# Patient Record
Sex: FEMALE | Race: WHITE | URBAN - METROPOLITAN AREA
[De-identification: names, ages, dates, MRNs, and addresses within clinical notes are randomized per-mention and may not be internally consistent; named-entity substitution may affect disease eponyms.]

---

## 2017-11-02 ENCOUNTER — OFFICE VISIT (OUTPATIENT)
Dept: FAMILY MEDICINE CLINIC | Age: 22
End: 2017-11-02

## 2017-11-02 VITALS
DIASTOLIC BLOOD PRESSURE: 64 MMHG | WEIGHT: 132.8 LBS | BODY MASS INDEX: 20.84 KG/M2 | TEMPERATURE: 99 F | OXYGEN SATURATION: 98 % | HEIGHT: 67 IN | RESPIRATION RATE: 18 BRPM | SYSTOLIC BLOOD PRESSURE: 98 MMHG | HEART RATE: 110 BPM

## 2017-11-02 DIAGNOSIS — J45.20 MILD INTERMITTENT ASTHMA WITHOUT COMPLICATION: Primary | ICD-10-CM

## 2017-11-02 DIAGNOSIS — F41.8 DEPRESSION WITH ANXIETY: ICD-10-CM

## 2017-11-02 RX ORDER — LEVONORGESTREL / ETHINYL ESTRADIOL AND ETHINYL ESTRADIOL 150-30(84)
30 KIT ORAL DAILY
COMMUNITY

## 2017-11-02 RX ORDER — ALBUTEROL SULFATE 90 UG/1
1 AEROSOL, METERED RESPIRATORY (INHALATION)
Qty: 1 INHALER | Refills: 2 | Status: SHIPPED | OUTPATIENT
Start: 2017-11-02

## 2017-11-02 RX ORDER — SERTRALINE HYDROCHLORIDE 25 MG/1
TABLET, FILM COATED ORAL
Qty: 60 TAB | Refills: 1 | Status: SHIPPED | OUTPATIENT
Start: 2017-11-02 | End: 2018-01-23 | Stop reason: SDUPTHER

## 2017-11-02 RX ORDER — FEXOFENADINE HCL AND PSEUDOEPHEDRINE HCI 180; 240 MG/1; MG/1
1 TABLET, EXTENDED RELEASE ORAL DAILY
COMMUNITY

## 2017-11-02 NOTE — PROGRESS NOTES
Patient Name: Penny Spencer   MRN: <[id number]>    Melisa Faustin is a 22 y.o. female who presents with the following: Here to establish care with new PCP. Hx of intermittent asthma. Triggers are usually exercise and humidity. Previously did well w/o albuterol inhaler but would like refill. Reports symptoms of anxiety/depression. Feels like it is worse over the past mnth, possible related to starting a new job. Denies active SI but will have fleeting thoughts of self harm without a plan. Support includes her boyfriend. Has previously seen a therapist in the past which was not helpful. KATIA and PHQ scores are high today. Would like to start meds. Has upcoming appt with GYN. Believes her current OCPs are causing low libido. Reports dx of PCOS. Review of Systems   Constitutional: Negative for chills, fever, malaise/fatigue and weight loss. HENT: Negative for hearing loss, nosebleeds and sore throat. Respiratory: Negative for cough, sputum production, shortness of breath and wheezing. Cardiovascular: Negative for chest pain, palpitations, leg swelling and PND. Gastrointestinal: Negative for abdominal pain, blood in stool, constipation, diarrhea, nausea and vomiting. Genitourinary: Negative for dysuria, frequency and urgency. Musculoskeletal: Negative for back pain, falls, joint pain, myalgias and neck pain. Skin: Negative for itching and rash. Neurological: Negative for dizziness, sensory change, focal weakness and loss of consciousness. Psychiatric/Behavioral: Positive for depression. Negative for hallucinations and substance abuse. The patient is nervous/anxious. All other systems reviewed and are negative. The patient's medications, allergies, past medical history, surgical history, family history and social history were reviewed and updated where appropriate. Prior to Admission medications    Medication Sig Start Date End Date Taking?  Authorizing Provider fexofenadine-pseudoephedrine (ALLEGRA-D 24 HOUR) 180-240 mg per tablet Take 1 Tab by mouth daily. Yes Historical Provider   L-Norgest&E Estradiol-E Estrad (ASHLYNA) 0.15 mg-30 mcg (84)/10 mcg (7) 3MPk Take 30 mcg by mouth daily. Yes Historical Provider   tazorotene (TAZORAC) 0.05 % topical cream Apply  to affected area nightly. use thin film   Yes Historical Provider       No Known Allergies      Past Medical History:   Diagnosis Date    Asthma 2009    Depression with anxiety 11/2/2017    PCOS (polycystic ovarian syndrome)        History reviewed. No pertinent surgical history. Family History   Problem Relation Age of Onset    Hypertension Mother     Hypertension Maternal Grandmother        Social History     Social History    Marital status: SINGLE     Spouse name: N/A    Number of children: N/A    Years of education: N/A     Occupational History    Not on file. Social History Main Topics    Smoking status: Never Smoker    Smokeless tobacco: Never Used    Alcohol use No    Drug use: No    Sexual activity: Yes     Partners: Male     Other Topics Concern    Not on file     Social History Narrative    No narrative on file           OBJECTIVE    Visit Vitals    BP 98/64 (BP 1 Location: Left arm, BP Patient Position: Sitting)    Pulse (!) 110    Temp 99 °F (37.2 °C) (Oral)    Resp 18    Ht 5' 7\" (1.702 m)    Wt 132 lb 12.8 oz (60.2 kg)    LMP 10/20/2017 (Approximate)    SpO2 98%    BMI 20.8 kg/m2       Physical Exam   Constitutional: She is well-developed, well-nourished, and in no distress. No distress. HENT:   Head: Normocephalic and atraumatic. Right Ear: External ear normal.   Left Ear: External ear normal.   Eyes: Conjunctivae and EOM are normal. Pupils are equal, round, and reactive to light. Cardiovascular: Normal rate, regular rhythm and normal heart sounds. Exam reveals no gallop and no friction rub. No murmur heard.   Pulmonary/Chest: Effort normal and breath sounds normal. No respiratory distress. She has no wheezes. Skin: She is not diaphoretic. Psychiatric: Mood, memory, affect and judgment normal.   Nursing note and vitals reviewed. ASSESSMENT AND PLAN  Randy Goncalves is a 22 y.o. female who presents today for:    1. Mild intermittent asthma without complication  - albuterol (PROVENTIL HFA, VENTOLIN HFA, PROAIR HFA) 90 mcg/actuation inhaler; Take 1 Puff by inhalation every six (6) hours as needed for Wheezing. Dispense: 1 Inhaler; Refill: 2    2. Depression with anxiety  Will start on SSRI therapy and titrate up; reviewed side effects and how to take medications. Pt denies active SI. May consider therapy in the future. - sertraline (ZOLOFT) 25 mg tablet; Take 1/2 tab by mouth daily for the first two weeks, then increased to 1 tab daily. Dispense: 60 Tab; Refill: 1       There are no discontinued medications. Follow-up Disposition:  Return in about 6 weeks (around 12/14/2017) for Medication Check. Medication risks/benefits/costs/interactions/alternatives discussed with patient. Advised patient to call back or return to office if symptoms worsen/change/persist. If patient cannot reach us or should anything more severe/urgent arise he/she should proceed directly to the nearest emergency department. Discussed expected course/resolution/complications of diagnosis in detail with patient. Patient given a written after visit summary which includes his/her diagnoses, current medications and vitals. Patient expressed understanding with the diagnosis and plan.      Thelma Acosta M.D.

## 2017-11-02 NOTE — MR AVS SNAPSHOT
Visit Information Date & Time Provider Department Dept. Phone Encounter #  
 11/2/2017  8:30 AM Palmira Dior MD [facility] [address] [place] [phone] [id number] Follow-up Instructions Return in about 6 weeks (around 12/14/2017) for Medication Check. Upcoming Health Maintenance Date Due Hepatitis A Peds Age 1-18 (1 of 2 - Standard Series) 11/13/1997 DTaP/Tdap/Td series (1 - Tdap) 11/13/2003 HPV AGE 9Y-26Y (1 of 3 - Female 3 Dose Series) 11/13/2007 INFLUENZA AGE 9 TO ADULT 8/1/2017 Allergies as of 11/2/2017  Review Complete On: 11/2/2017 By: Palmira Dior MD  
 No Known Allergies Current Immunizations  Never Reviewed No immunizations on file. Not reviewed this visit You Were Diagnosed With   
  
 Codes Comments Mild intermittent asthma without complication    -  Primary ICD-10-CM: J45.20 ICD-9-CM: 493.90 Vitals BP Pulse Temp Resp Height(growth percentile) Weight(growth percentile) 98/64 (BP 1 Location: Left arm, BP Patient Position: Sitting) (!) 110 99 °F (37.2 °C) (Oral) 18 5' 7\" (1.702 m) 132 lb 12.8 oz (60.2 kg) LMP SpO2 BMI OB Status Smoking Status 10/20/2017 (Approximate) 98% 20.8 kg/m2 Having regular periods Never Smoker BMI and BSA Data Body Mass Index Body Surface Area  
 20.8 kg/m 2 1.69 m 2 Preferred Pharmacy Pharmacy Name Phone [facility] [phone] Your Updated Medication List  
  
   
This list is accurate as of: 11/2/17  8:48 AM.  Always use your most recent med list. ALLEGRA-D 24 HOUR 180-240 mg per tablet Generic drug:  fexofenadine-pseudoephedrine Take 1 Tab by mouth daily. ASHLYNA 0.15 mg-30 mcg (84)/10 mcg (7) 3mpk Generic drug:  L-Norgest&E Grubbs Og Take 30 mcg by mouth daily. TAZORAC 0.05 % topical cream  
Generic drug:  tazorotene Apply  to affected area nightly. use thin film Follow-up Instructions Return in about 6 weeks (around 12/14/2017) for Medication Check. Introducing [facility]! [employer] introduces [employer]Echelle patient portal. Now you can access parts of your medical record, email your doctor's office, and request medication refills online. 1. In your internet browser, go to [email or web address] 2. Click on the First Time User? Click Here link in the Sign In box. You will see the New Member Sign Up page. 3. Enter your monEchelle Access Code exactly as it appears below. You will not need to use this code after youve completed the sign-up process. If you do not sign up before the expiration date, you must request a new code. · monEchelle Access Code: [email or web address] Expires: 1/31/2018  8:35 AM 
 
4. Enter the last four digits of your Social Security Number (xxxx) and Date of Birth (mm/dd/yyyy) as indicated and click Submit. You will be taken to the next sign-up page. 5. Create a monEchelle ID. This will be your monEchelle login ID and cannot be changed, so think of one that is secure and easy to remember. 6. Create a monEchelle password. You can change your password at any time. 7. Enter your Password Reset Question and Answer. This can be used at a later time if you forget your password. 8. Enter your e-mail address. You will receive e-mail notification when new information is available in [address] 9. Click Sign Up. You can now view and download portions of your medical record. 10. Click the Download Summary menu link to download a portable copy of your medical information. If you have questions, please visit the Frequently Asked Questions section of the monEchelle website. Remember, monEchelle is NOT to be used for urgent needs. For medical emergencies, dial 911. Now available from your iPhone and Android! Please provide this summary of care documentation to your next provider. Your primary care clinician is listed as Marcela Dunn. If you have any questions after today's visit, please call [phone].

## 2017-11-02 NOTE — PROGRESS NOTES
Chief Complaint   Patient presents with    New Patient     establish care - has gyn    Asthma    Medication Evaluation     birth control     Anxiety    Depression     1. Have you been to the ER, urgent care clinic since your last visit? Hospitalized since your last visit? No    2. Have you seen or consulted any other health care providers outside of the [address] [place] [address] since your last visit? Include any pap smears or colon screening.  no

## 2018-01-23 ENCOUNTER — OFFICE VISIT (OUTPATIENT)
Dept: FAMILY MEDICINE CLINIC | Age: 23
End: 2018-01-23

## 2018-01-23 VITALS
TEMPERATURE: 98.2 F | SYSTOLIC BLOOD PRESSURE: 100 MMHG | BODY MASS INDEX: 21.19 KG/M2 | OXYGEN SATURATION: 98 % | HEIGHT: 67 IN | RESPIRATION RATE: 18 BRPM | WEIGHT: 135 LBS | DIASTOLIC BLOOD PRESSURE: 58 MMHG | HEART RATE: 131 BPM

## 2018-01-23 DIAGNOSIS — Z13.1 SCREENING FOR DIABETES MELLITUS: ICD-10-CM

## 2018-01-23 DIAGNOSIS — F41.8 DEPRESSION WITH ANXIETY: Primary | ICD-10-CM

## 2018-01-23 DIAGNOSIS — R35.0 FREQUENT URINATION: ICD-10-CM

## 2018-01-23 DIAGNOSIS — Z13.220 SCREENING FOR LIPID DISORDERS: ICD-10-CM

## 2018-01-23 DIAGNOSIS — R00.0 TACHYCARDIA: ICD-10-CM

## 2018-01-23 DIAGNOSIS — G47.429 NARCOLEPSY DUE TO UNDERLYING CONDITION WITHOUT CATAPLEXY: ICD-10-CM

## 2018-01-23 PROBLEM — G47.419 NARCOLEPSY: Status: ACTIVE | Noted: 2017-12-01

## 2018-01-23 RX ORDER — SERTRALINE HYDROCHLORIDE 50 MG/1
50 TABLET, FILM COATED ORAL DAILY
Qty: 60 TAB | Refills: 1 | Status: SHIPPED | OUTPATIENT
Start: 2018-01-23 | End: 2018-04-20 | Stop reason: SDUPTHER

## 2018-01-23 NOTE — MR AVS SNAPSHOT
[facility] 
 
 
 [address] [place] [address] 
[phone] Patient: Vazquez Randle MRN: [id number] :1995 Visit Information Date & Time Provider Department Dept. Phone Encounter #  
 2018  1:45 PM Chelly Argueta  [facility] [phone] [id number] Follow-up Instructions Return in about 6 weeks (around 3/6/2018) for Medication Check. Upcoming Health Maintenance Date Due  
 HPV AGE 9Y-34Y (1 of 3 - Female 3 Dose Series) 2007 Pneumococcal 19-64 Medium Risk (1 of 1 - PPSV23) 2015 Influenza Age 5 to Adult 2017 DTaP/Tdap/Td series (1 - Tdap) 2017 PAP AKA CERVICAL CYTOLOGY 2017 Allergies as of 2018  Review Complete On: 2018 By: Silvina Temple No Known Allergies Current Immunizations  Never Reviewed No immunizations on file. Not reviewed this visit You Were Diagnosed With   
  
 Codes Comments Screening for diabetes mellitus    -  Primary ICD-10-CM: Z13.1 ICD-9-CM: V77.1 Depression with anxiety     ICD-10-CM: F41.8 ICD-9-CM: 300.4 Screening for lipid disorders     ICD-10-CM: Z13.220 ICD-9-CM: V77.91 Frequent urination     ICD-10-CM: R35.0 ICD-9-CM: 788.41 Vitals BP Pulse Temp Resp Height(growth percentile) Weight(growth percentile) 100/58 (BP 1 Location: Right arm, BP Patient Position: Sitting) (!) 131 98.2 °F (36.8 °C) (Oral) 18 5' 7\" (1.702 m) 135 lb (61.2 kg) LMP SpO2 BMI OB Status Smoking Status 2017 (Exact Date) 98% 21.14 kg/m2 Having regular periods Never Smoker Vitals History BMI and BSA Data Body Mass Index Body Surface Area  
 21.14 kg/m 2 1.7 m 2 Preferred Pharmacy Pharmacy Name Phone [facility] [phone] Your Updated Medication List  
  
   
 This list is accurate as of: 1/23/18  2:07 PM.  Always use your most recent med list.  
  
  
  
  
 albuterol 90 mcg/actuation inhaler Commonly known as:  PROVENTIL HFA, VENTOLIN HFA, PROAIR HFA Take 1 Puff by inhalation every six (6) hours as needed for Wheezing. ALLEGRA-D 24 HOUR 180-240 mg per tablet Generic drug:  fexofenadine-pseudoephedrine Take 1 Tab by mouth daily. ASHLYNA 0.15 mg-30 mcg (84)/10 mcg (7) 3mpk Generic drug:  L-Norgest&E Jamil Mend Take 30 mcg by mouth daily. sertraline 50 mg tablet Commonly known as:  ZOLOFT Take 1 Tab by mouth daily. DOSE CHANGE  
  
 TAZORAC 0.05 % topical cream  
Generic drug:  tazorotene Apply  to affected area nightly. use thin film Prescriptions Sent to Pharmacy Refills  
 sertraline (ZOLOFT) 50 mg tablet 1 Sig: Take 1 Tab by mouth daily. DOSE CHANGE Class: Normal  
 Pharmacy: [place] [id number] [place] [address],[address] #: [phone] Route: Oral  
  
We Performed the Following CULTURE, URINE [id number] CPT(R)] HEMOGLOBIN A1C WITH EAG [33931 CPT(R)] LIPID PANEL [10615 CPT(R)] METABOLIC PANEL, COMPREHENSIVE [97631 CPT(R)] Follow-up Instructions Return in about 6 weeks (around 3/6/2018) for Medication Check. Patient Instructions Hemoglobin A1c: About This Test 
What is it? Hemoglobin A1c is a blood test that checks your average blood sugar level over the past 2 to 3 months. This test also is called a glycohemoglobin test or an A1c test. 
Why is this test done? The A1c test is done to check how well your diabetes has been controlled over the past 2 to 3 months. Your doctor can use this information to adjust your medicine and diabetes treatment, if needed.  
How can you prepare for the test? 
You do not need to stop eating before you have an A1c test. This test can be done at any time during the day, even after a meal. 
 What happens during the test? 
The health professional taking a sample of your blood will: · Wrap an elastic band around your upper arm. This makes the veins below the band larger so it is easier to put a needle into the vein. · Clean the needle site with alcohol. · Put the needle into the vein. · Attach a tube to the needle to fill it with blood. · Remove the band from your arm when enough blood is collected. · Put a gauze pad or cotton ball over the needle site as the needle is removed. · Put pressure on the site and then put on a bandage. What else should you know about the test? 
The test result is usually given as a percentage. The normal A1c is less than 5.7%. The A1c test result also can be used to find your estimated average glucose, or eAG. Your eAG and A1c show the same thing in two different ways. They both help you learn more about your average blood sugar range over the past 2 to 3 months. Where can you learn more? Go to http://hanh-jon.info/. Enter U216 in the search box to learn more about \"Hemoglobin A1c: About This Test.\" Current as of: March 13, 2017 Content Version: 11.4 © 4899-1908 [employer]. Care instructions adapted under license by [employer] (which disclaims liability or warranty for this information). If you have questions about a medical condition or this instruction, always ask your healthcare professional. [place] [address] any warranty or liability for your use of this information. Introducing [facility]! Natalie Reeder introduces [employer] patient portal. Now you can access parts of your medical record, email your doctor's office, and request medication refills online. 1. In your internet browser, go to [email or web address] 2. Click on the First Time User? Click Here link in the Sign In box. You will see the New Member Sign Up page. 3. Enter your Solid Sound Access Code exactly as it appears below. You will not need to use this code after youve completed the sign-up process. If you do not sign up before the expiration date, you must request a new code. · Solid Sound Access Code: [email or web address] Expires: 1/31/2018  7:35 AM 
 
4. Enter the last four digits of your Social Security Number (xxxx) and Date of Birth (mm/dd/yyyy) as indicated and click Submit. You will be taken to the next sign-up page. 5. Create a Solid Sound ID. This will be your Solid Sound login ID and cannot be changed, so think of one that is secure and easy to remember. 6. Create a Solid Sound password. You can change your password at any time. 7. Enter your Password Reset Question and Answer. This can be used at a later time if you forget your password. 8. Enter your e-mail address. You will receive e-mail notification when new information is available in [address] 9. Click Sign Up. You can now view and download portions of your medical record. 10. Click the Download Summary menu link to download a portable copy of your medical information. If you have questions, please visit the Frequently Asked Questions section of the Solid Sound website. Remember, Solid Sound is NOT to be used for urgent needs. For medical emergencies, dial 911. Now available from your iPhone and Android! Please provide this summary of care documentation to your next provider. Your primary care clinician is listed as Marcela Dunn. If you have any questions after today's visit, please call [phone].

## 2018-01-23 NOTE — PROGRESS NOTES
Chief Complaint   Patient presents with    Depression     zoloft    Asthma     Proair     1. Have you been to the ER, urgent care clinic since your last visit? Hospitalized since your last visit? No    2. Have you seen or consulted any other health care providers outside of the [address] [place] [address] since your last visit? Include any pap smears or colon screening.  No

## 2018-01-23 NOTE — PATIENT INSTRUCTIONS
Hemoglobin A1c: About This Test  What is it? Hemoglobin A1c is a blood test that checks your average blood sugar level over the past 2 to 3 months. This test also is called a glycohemoglobin test or an A1c test.  Why is this test done? The A1c test is done to check how well your diabetes has been controlled over the past 2 to 3 months. Your doctor can use this information to adjust your medicine and diabetes treatment, if needed. How can you prepare for the test?  You do not need to stop eating before you have an A1c test. This test can be done at any time during the day, even after a meal.  What happens during the test?  The health professional taking a sample of your blood will:  · Wrap an elastic band around your upper arm. This makes the veins below the band larger so it is easier to put a needle into the vein. · Clean the needle site with alcohol. · Put the needle into the vein. · Attach a tube to the needle to fill it with blood. · Remove the band from your arm when enough blood is collected. · Put a gauze pad or cotton ball over the needle site as the needle is removed. · Put pressure on the site and then put on a bandage. What else should you know about the test?  The test result is usually given as a percentage. The normal A1c is less than 5.7%. The A1c test result also can be used to find your estimated average glucose, or eAG. Your eAG and A1c show the same thing in two different ways. They both help you learn more about your average blood sugar range over the past 2 to 3 months. Where can you learn more? Go to http://hanh-jon.info/. Enter U216 in the search box to learn more about \"Hemoglobin A1c: About This Test.\"  Current as of: March 13, 2017  Content Version: 11.4  © 8941-1767 [employer]sec. Care instructions adapted under license by [employer] (which disclaims liability or warranty for this information).  If you have questions about a medical condition or this instruction, always ask your healthcare professional. [place] [address] any warranty or liability for your use of this information.

## 2018-01-23 NOTE — PROGRESS NOTES
Patient Name: Maria Elena Malave   MRN: <[id number]>    Erwin Galvez is a 25 y.o. female who presents with the following:     Here today to follow-up on Zoloft initiation. Has taken 25 mg for the past month and denies any side effects. Would like to try higher dose. Reports one month hx of increased urinary frequency only at night. Denies dysuria, fevers, daytime symptoms. Was pre-diabetic but would like updated labs. Diagnosed with narcolepsy. Sleep doctor recommend to starting a stimulant medication but she has not yet done so. Review of Systems   Constitutional: Negative for fever, malaise/fatigue and weight loss. Respiratory: Negative for cough, hemoptysis, shortness of breath and wheezing. Cardiovascular: Negative for chest pain, palpitations, leg swelling and PND. Gastrointestinal: Negative for abdominal pain, constipation, diarrhea, nausea and vomiting. Genitourinary: Positive for frequency. Negative for dysuria, flank pain, hematuria and urgency. The patient's medications, allergies, past medical history, surgical history, family history and social history were reviewed and updated where appropriate. Prior to Admission medications    Medication Sig Start Date End Date Taking? Authorizing Provider   fexofenadine-pseudoephedrine (ALLEGRA-D 24 HOUR) 180-240 mg per tablet Take 1 Tab by mouth daily. Yes Historical Provider   L-Norgest&E Estradiol-E Estrad (ASHLYNA) 0.15 mg-30 mcg (84)/10 mcg (7) 3MPk Take 30 mcg by mouth daily. Yes Historical Provider   tazorotene (TAZORAC) 0.05 % topical cream Apply  to affected area nightly. use thin film   Yes Historical Provider   albuterol (PROVENTIL HFA, VENTOLIN HFA, PROAIR HFA) 90 mcg/actuation inhaler Take 1 Puff by inhalation every six (6) hours as needed for Wheezing. 11/2/17  Yes Sarah Zarate MD   sertraline (ZOLOFT) 25 mg tablet Take 1/2 tab by mouth daily for the first two weeks, then increased to 1 tab daily.  11/2/17  Yes Barbara Packer MD       No Known Allergies        OBJECTIVE    Visit Vitals    /58 (BP 1 Location: Right arm, BP Patient Position: Sitting)    Pulse (!) 131    Temp 98.2 °F (36.8 °C) (Oral)    Resp 18    Ht 5' 7\" (1.702 m)    Wt 135 lb (61.2 kg)    LMP 12/18/2017 (Exact Date)    SpO2 98%    BMI 21.14 kg/m2       Physical Exam   Constitutional: She is oriented to person, place, and time and well-developed, well-nourished, and in no distress. No distress. HENT:   Head: Normocephalic and atraumatic. Right Ear: External ear normal.   Left Ear: External ear normal.   Eyes: Conjunctivae and EOM are normal. Pupils are equal, round, and reactive to light. Neurological: She is alert and oriented to person, place, and time. Gait normal.   Skin: She is not diaphoretic. Psychiatric: Mood, memory, affect and judgment normal.   Nursing note and vitals reviewed. ASSESSMENT AND PLAN  Tiffanie Goode is a 25 y.o. female who presents today for:    1. Depression with anxiety  Will increase dose from 25 mg to 50 mg daily. - sertraline (ZOLOFT) 50 mg tablet; Take 1 Tab by mouth daily. DOSE CHANGE  Dispense: 60 Tab; Refill: 1    2. Screening for diabetes mellitus  - HEMOGLOBIN A1C WITH EAG    3. Screening for lipid disorders  - METABOLIC PANEL, COMPREHENSIVE  - LIPID PANEL    4. Frequent urination  R/o UTI.  - CULTURE, URINE    5. Narcolepsy due to underlying condition without cataplexy  Recommend pt to look out for SEs if on both stimulant and SSRI. 6. Tachycardia  Possible due to anxiety today; if persistent at next visit, will obtain EKG at next visit. Medications Discontinued During This Encounter   Medication Reason    sertraline (ZOLOFT) 25 mg tablet Reorder       Follow-up Disposition:  Return in about 6 weeks (around 3/6/2018) for Medication Check. Medication risks/benefits/costs/interactions/alternatives discussed with patient.   Advised patient to call back or return to office if symptoms worsen/change/persist. If patient cannot reach us or should anything more severe/urgent arise he/she should proceed directly to the nearest emergency department. Discussed expected course/resolution/complications of diagnosis in detail with patient. Patient given a written after visit summary which includes his/her diagnoses, current medications and vitals. Patient expressed understanding with the diagnosis and plan.      Clement Ching M.D.

## 2018-01-24 LAB
ALBUMIN SERPL-MCNC: 4.2 G/DL (ref 3.5–5.5)
ALBUMIN/GLOB SERPL: 1.3 {RATIO} (ref 1.2–2.2)
ALP SERPL-CCNC: 57 IU/L (ref 39–117)
ALT SERPL-CCNC: 9 IU/L (ref 0–32)
AST SERPL-CCNC: 17 IU/L (ref 0–40)
BACTERIA UR CULT: NORMAL
BILIRUB SERPL-MCNC: 0.3 MG/DL (ref 0–1.2)
BUN SERPL-MCNC: 13 MG/DL (ref 6–20)
BUN/CREAT SERPL: 21 (ref 9–23)
CALCIUM SERPL-MCNC: 8.9 MG/DL (ref 8.7–10.2)
CHLORIDE SERPL-SCNC: 96 MMOL/L (ref 96–106)
CHOLEST SERPL-MCNC: 192 MG/DL (ref 100–199)
CO2 SERPL-SCNC: 25 MMOL/L (ref 18–29)
CREAT SERPL-MCNC: 0.62 MG/DL (ref 0.57–1)
EST. AVERAGE GLUCOSE BLD GHB EST-MCNC: 103 MG/DL
GLOBULIN SER CALC-MCNC: 3.2 G/DL (ref 1.5–4.5)
GLUCOSE SERPL-MCNC: 71 MG/DL (ref 65–99)
HBA1C MFR BLD: 5.2 % (ref 4.8–5.6)
HDLC SERPL-MCNC: 48 MG/DL
INTERPRETATION, 910389: NORMAL
LDLC SERPL CALC-MCNC: 120 MG/DL (ref 0–99)
POTASSIUM SERPL-SCNC: 3.9 MMOL/L (ref 3.5–5.2)
PROT SERPL-MCNC: 7.4 G/DL (ref 6–8.5)
SODIUM SERPL-SCNC: 138 MMOL/L (ref 134–144)
TRIGL SERPL-MCNC: 120 MG/DL (ref 0–149)
VLDLC SERPL CALC-MCNC: 24 MG/DL (ref 5–40)

## 2018-01-25 NOTE — PROGRESS NOTES
Please notify patient regarding their test results:    No UTI. No DM. Mild LDL cholesterol; I would encourage a healthy diet and regular exercise with the goal of maintaining a healthy weight before starting medications for this.

## 2018-04-19 ENCOUNTER — TELEPHONE (OUTPATIENT)
Dept: FAMILY MEDICINE CLINIC | Age: 23
End: 2018-04-19

## 2018-04-19 DIAGNOSIS — F41.8 DEPRESSION WITH ANXIETY: ICD-10-CM

## 2018-04-19 NOTE — TELEPHONE ENCOUNTER
----- Message from Onel Chou sent at 4/19/2018  2:50 PM EDT -----  Regarding: Dr Qing Hernandez  Pt would like to speak to the doctor about getting a prescription for Zoloft call into [facility] [phone], pt can be reach at [phone] if you have any questions.

## 2018-04-20 RX ORDER — SERTRALINE HYDROCHLORIDE 50 MG/1
TABLET, FILM COATED ORAL
Qty: 60 TAB | Refills: 1 | Status: SHIPPED | OUTPATIENT
Start: 2018-04-20

## 2018-04-20 NOTE — TELEPHONE ENCOUNTER
If patient has not take Zoloft in the past 2 months, would recommend restarting at the lower dose of 25 mg daily for the first 2 weeks (=1/2 tab of 50 mg tab) and then increase to 1 full tab of 50 mg daily after 2 weeks. Would recommend follow-up visit in 2 months. Rx sent.

## 2018-04-20 NOTE — TELEPHONE ENCOUNTER
Call to patient.  verified. Informed patient of provider's recommendations. She understands and states she will back to schedule.  Advised we do fill up fast.

## 2018-04-20 NOTE — TELEPHONE ENCOUNTER
Call to patient.  verified. She states at her last appointment she was to increase her zoloft to 50mg. Advised it was sent to [facility]. She states her pharmacy has changed to [email or web address]. Patient is requesting fill of zoloft 50mg to be sent to pharmacy. Patient states she has not picked up the medication that was sent in January and has not taken zoloft in nearly 2 months. Advised an appointment is needed as she is overdue for follow up. She states provider should be aware of this.

## 2018-07-19 ENCOUNTER — TELEPHONE (OUTPATIENT)
Dept: FAMILY MEDICINE CLINIC | Age: 23
End: 2018-07-19

## 2018-07-19 NOTE — TELEPHONE ENCOUNTER
Kerry mckeon/Dr. Yaima Dave is requesting last lab work on patient. (P) [phone] & (F) [phone].

## 2018-11-05 ENCOUNTER — TELEPHONE (OUTPATIENT)
Dept: FAMILY MEDICINE CLINIC | Age: 23
End: 2018-11-05

## 2018-11-05 NOTE — TELEPHONE ENCOUNTER
Patient is requesting a new prescription for MODAFINIL 200 mg  She was prescribed by her Sleep study doctor Dr Luther Cordon. She is requesting call back to know if Dr Diogo Khan if she can prescribe this medication or she has to go to her Sleep study doctor.   Best call back : [phone]  Pharmacy verified  LOV: January 23, 2018

## 2018-11-06 NOTE — TELEPHONE ENCOUNTER
----- Message from [employer] sent at 11/6/2018 12:03 PM EST -----  Regarding: Jennifer/telephone  Pt is returning a call from yesterday. Pts number is [phone].